# Patient Record
Sex: FEMALE | URBAN - METROPOLITAN AREA
[De-identification: names, ages, dates, MRNs, and addresses within clinical notes are randomized per-mention and may not be internally consistent; named-entity substitution may affect disease eponyms.]

---

## 2017-02-18 ENCOUNTER — HOSPITAL ENCOUNTER (EMERGENCY)
Facility: CLINIC | Age: 22
Discharge: HOME | End: 2017-02-18
Attending: NURSE PRACTITIONER | Admitting: NURSE PRACTITIONER
Payer: COMMERCIAL

## 2017-02-18 ENCOUNTER — APPOINTMENT (OUTPATIENT)
Dept: GENERAL RADIOLOGY | Facility: CLINIC | Age: 22
End: 2017-02-18
Attending: NURSE PRACTITIONER
Payer: COMMERCIAL

## 2017-02-18 VITALS
RESPIRATION RATE: 18 BRPM | WEIGHT: 150 LBS | BODY MASS INDEX: 23.54 KG/M2 | HEART RATE: 73 BPM | HEIGHT: 67 IN | DIASTOLIC BLOOD PRESSURE: 86 MMHG | SYSTOLIC BLOOD PRESSURE: 114 MMHG | TEMPERATURE: 98 F | OXYGEN SATURATION: 99 %

## 2017-02-18 DIAGNOSIS — S63.619A: ICD-10-CM

## 2017-02-18 DIAGNOSIS — S69.92XA FINGER INJURY, LEFT, INITIAL ENCOUNTER: ICD-10-CM

## 2017-02-18 PROCEDURE — 73140 X-RAY EXAM OF FINGER(S): CPT | Mod: LT

## 2017-02-18 PROCEDURE — 29130 APPL FINGER SPLINT STATIC: CPT | Mod: F3

## 2017-02-18 PROCEDURE — 99283 EMERGENCY DEPT VISIT LOW MDM: CPT | Mod: 25

## 2017-02-18 ASSESSMENT — ENCOUNTER SYMPTOMS
RESPIRATORY NEGATIVE: 1
CARDIOVASCULAR NEGATIVE: 1
NEUROLOGICAL NEGATIVE: 1

## 2017-02-18 NOTE — ED AVS SNAPSHOT
[facility] Emergency Department    [address]    [facility] [address]    Phone:  [phone]    Fax:  [phone]                                       Tana Anderson   MRN: [id number]    Department:  [facility] Emergency Department   Date of Visit:  2/18/2017           After Visit Summary Signature Page     I have received my discharge instructions, and my questions have been answered. I have discussed any challenges I see with this plan with the nurse or doctor.    ..........................................................................................................................................  Patient/Patient Representative Signature      ..........................................................................................................................................  Patient Representative Print Name and Relationship to Patient    ..................................................               ................................................  Date                                            Time    ..........................................................................................................................................  Reviewed by Signature/Title    ...................................................              ..............................................  Date                                                            Time

## 2017-02-18 NOTE — ED AVS SNAPSHOT
[facility] Emergency Department    [address] [place] [address]    Phone:  [phone]    Fax:  [phone]                                       Tana Anderson   MRN: [id number]    Department:  [facility] Emergency Department   Date of Visit:  2/18/2017           Patient Information     Date Of Birth          1995        Your diagnoses for this visit were:     Finger injury, left, initial encounter     Sprain of finger, initial encounter        You were seen by Nicole Zapata APRN CNP.      Follow-up Information     Follow up with Verified, No Ref-Primary In 1 week.        Discharge Instructions         Self-Care for Strains and Sprains  Most minor strains and sprains can be treated with self-care. Recovering from a strain or sprain may take 6 to 8 weeks. Your self-care goal is to reduce pain and immobilize the injury to speed healing.     A sprain injures ligaments (tissue that connects bones to bones).        A strain injures muscles or tendons (tissue that connects muscles to bones).   Support the injured area  Wrapping the injured area provides support for short, necessary activities. Be careful not to wrap the area too tightly. This could cut off the blood supply.    Support a wrist, elbow, or shoulder with a sling.    Wrap an ankle or knee with an elastic bandage.    Tape a finger or toe to the one next to it.  Use cold and heat  Cold reduces swelling. Both cold and heat reduce pain. Heat should not be used in the initial treatment of the injury. When using cold or heat, always place a towel between the pack and your skin.    Apply ice or a cold pack 10 to 15 minutes every hour you re awake for the first 2 days.    After the swelling goes down, use cold or heat to control pain. Don t use heat late in the day, since it can cause swelling when you re not active.  Rest and elevate  Rest and elevation help your injury heal faster.    Raise the injured  area above your heart level.    Keep the injured area from moving.    Limit the use of the joint or limb.  Use medicine    Aspirin reduces pain and swelling. (Note: Don t give aspirin to a child 18 or younger unless prescribed by the doctor.)    Aspirin substitutes, such as ibuprofen, can reduce pain. Some substitutes reduce swelling, too. Ask your pharmacist which substitutes you can use.  Call your doctor if:    The injured joint won t move, or bones make a grating sound when they move.    You can t put weight on the injured area, even after 24 hours.    The injured body part is cold, blue, or numb.    The joint or limb appears bent or crooked.    Pain increases or doesn t improve in 4 days.    When pressing along the injured area, you notice a spot that is especially painful.     5850-7324 [employer]. [address] [place], [place] [address]. All rights reserved. This information is not intended as a substitute for professional medical care. Always follow your healthcare professional's instructions.          24 Hour Appointment Hotline       To make an appointment at any [facility], call [phone] ([phone]). If you don't have a family doctor or clinic, we will help you find one. [facility] are conveniently located to serve the needs of you and your family.             Review of your medicines      Our records show that you are taking the medicines listed below. If these are incorrect, please call your family doctor or clinic.        Dose / Directions Last dose taken    escitalopram 10 MG tablet   Commonly known as:  LEXAPRO   Dose:  10 mg   Quantity:  30 tablet        Take 1 tablet (10 mg) by mouth daily   Refills:  1                Procedures and tests performed during your visit     Fingers XR, 2-3 views, left      Orders Needing Specimen Collection     None      Pending Results     No orders found from 2/16/2017 to 2/19/2017.            Pending Culture Results     No  orders found from 2/16/2017 to 2/19/2017.             Test Results from your hospital stay     2/18/2017 10:46 PM - Interface, Radiant Ib      Narrative     FINGER(S) TWO-THREE VIEWS LEFT  2/18/2017 10:39 PM     HISTORY: Fourth finger injury, left.    COMPARISON: None.    FINDINGS: There is no significant degenerative change. There is no  acute fracture or dislocation. There are no worrisome bony lesions.        Impression     IMPRESSION: No acute osseous abnormality demonstrated.     SACHI HERNANDEZ MD                Clinical Quality Measure: Blood Pressure Screening     Your blood pressure was checked while you were in the emergency department today. The last reading we obtained was  BP: 114/86 . Please read the guidelines below about what these numbers mean and what you should do about them.  If your systolic blood pressure (the top number) is less than 120 and your diastolic blood pressure (the bottom number) is less than 80, then your blood pressure is normal. There is nothing more that you need to do about it.  If your systolic blood pressure (the top number) is 120-139 or your diastolic blood pressure (the bottom number) is 80-89, your blood pressure may be higher than it should be. You should have your blood pressure rechecked within a year by a primary care provider.  If your systolic blood pressure (the top number) is 140 or greater or your diastolic blood pressure (the bottom number) is 90 or greater, you may have high blood pressure. High blood pressure is treatable, but if left untreated over time it can put you at risk for heart attack, stroke, or kidney failure. You should have your blood pressure rechecked by a primary care provider within the next 4 weeks.  If your provider in the emergency department today gave you specific instructions to follow-up with your doctor or provider even sooner than that, you should follow that instruction and not wait for up to 4 weeks for your follow-up visit.       "  Thank you for choosing [facility]       Thank you for choosing [facility] for your care. Our goal is always to provide you with excellent care. Hearing back from our patients is one way we can continue to improve our services. Please take a few minutes to complete the written survey that you may receive in the mail after you visit with us. Thank you!        [employer] Information     [employer] lets you send messages to your doctor, view your test results, renew your prescriptions, schedule appointments and more. To sign up, go to [email or web address] . Click on \"Log in\" on the left side of the screen, which will take you to the Welcome page. Then click on \"Sign up Now\" on the right side of the page.     You will be asked to enter the access code listed below, as well as some personal information. Please follow the directions to create your username and password.     Your access code is: [email or web address]  Expires: 2017 10:56 PM     Your access code will  in 90 days. If you need help or a new code, please call your [facility] clinic or [phone].        Care EveryWhere ID     This is your Care EveryWhere ID. This could be used by other organizations to access your [facility] medical records  [id number]        After Visit Summary       This is your record. Keep this with you and show to your community pharmacist(s) and doctor(s) at your next visit.                  "

## 2017-02-19 NOTE — DISCHARGE INSTRUCTIONS
Self-Care for Strains and Sprains  Most minor strains and sprains can be treated with self-care. Recovering from a strain or sprain may take 6 to 8 weeks. Your self-care goal is to reduce pain and immobilize the injury to speed healing.     A sprain injures ligaments (tissue that connects bones to bones).        A strain injures muscles or tendons (tissue that connects muscles to bones).   Support the injured area  Wrapping the injured area provides support for short, necessary activities. Be careful not to wrap the area too tightly. This could cut off the blood supply.    Support a wrist, elbow, or shoulder with a sling.    Wrap an ankle or knee with an elastic bandage.    Tape a finger or toe to the one next to it.  Use cold and heat  Cold reduces swelling. Both cold and heat reduce pain. Heat should not be used in the initial treatment of the injury. When using cold or heat, always place a towel between the pack and your skin.    Apply ice or a cold pack 10 to 15 minutes every hour you re awake for the first 2 days.    After the swelling goes down, use cold or heat to control pain. Don t use heat late in the day, since it can cause swelling when you re not active.  Rest and elevate  Rest and elevation help your injury heal faster.    Raise the injured area above your heart level.    Keep the injured area from moving.    Limit the use of the joint or limb.  Use medicine    Aspirin reduces pain and swelling. (Note: Don t give aspirin to a child 18 or younger unless prescribed by the doctor.)    Aspirin substitutes, such as ibuprofen, can reduce pain. Some substitutes reduce swelling, too. Ask your pharmacist which substitutes you can use.  Call your doctor if:    The injured joint won t move, or bones make a grating sound when they move.    You can t put weight on the injured area, even after 24 hours.    The injured body part is cold, blue, or numb.    The joint or limb appears bent or crooked.    Pain increases  or doesn t improve in 4 days.    When pressing along the injured area, you notice a spot that is especially painful.     1691-1765 [employer]. [address] [place] [address], [place] [address]. All rights reserved. This information is not intended as a substitute for professional medical care. Always follow your healthcare professional's instructions.

## 2017-02-19 NOTE — ED PROVIDER NOTES
"  History     Chief Complaint:    Hand Pain      HPI   Tana Anderson is a 21 year old female who presents with left hand finger injury. Patient states she tried to stop a running dog by grabbing its collar. The dog took off thus sustaining injury to finger. No pain or injury to wrist or elbow.     Allergies:    No Known Allergies     Medications:        escitalopram (LEXAPRO) 10 MG tablet       Problem List:      Patient Active Problem List    Diagnosis Date Noted     Suicidal ideation 09/22/2015     Priority: Medium     Allergic rhinitis 08/31/2009     Problem list name updated by automated process. Provider to review          Past Medical History:      Past Medical History   Diagnosis Date     Depressive disorder        Past Surgical History:      Past Surgical History   Procedure Laterality Date     Hc remove tonsils/adenoids,<12 y/o       T & A <13y.o.     C appendectomy  1/19/2010       Family History:      Family History   Problem Relation Age of Onset     C.A.D. No family hx of      DIABETES No family hx of      Hypertension No family hx of      Breast Cancer No family hx of      Cancer - colorectal No family hx of        Social History:    Marital Status:  Single [1]  Social History   Substance Use Topics     Smoking status: Never Smoker     Smokeless tobacco: Not on file     Alcohol use No        Review of Systems   HENT: Negative.    Respiratory: Negative.    Cardiovascular: Negative.    Neurological: Negative.    Left hand finger injury  All other systems are negative.        Physical Exam   First Vitals:  BP: 114/86  Pulse: 73  Temp: 98  F (36.7  C)  Resp: 18  Height: 170.2 cm (5' 7\")  Weight: 68 kg (150 lb)  SpO2: 99 %      Physical Exam  Eyes: Pupils equally round  HENT: Head is normal in appearance. Oropharynx is normal with moist mucus membranes.  Cardiovascular: Normal color of mucus membranes  Respiratory: Normal respiratory effort  Musculoskeletal: Left hand finger mild soft tissue swelling, " normal flexion and extension, no open skin, radial and ulna pulses intact, CMS intact to fingers.    Skin: Normal, without rash.  Lymphatic: No edema  Neurologic: Cranial nerves grossly intact, normal cognition, no apparent deficits.  Psychiatric: Normal affect.      Emergency Department Course     Emergency Department Course:    ED Course       Impression & Plan      Medical Decision Making:  Tana Nguyen female presents with left hand finger injury. XR is negative for fractures. Based on exam and presentation I do not suspect ligamentous injury patient has full ROM to finger. Normal radial and ulna pulses. This appears to be a finger sprain. Patient provided with a removal finger splint to use prn. Instructed on RICE, will use OTC remedies prn. Return ED if symptoms persist or develops new or worsening symptoms. Recommend follow up with PCP in 2-3 days.     Diagnosis:    ICD-10-CM    1. Finger injury, left, initial encounter S69.92XA    2. Sprain of finger, initial encounter S63.619A        Disposition:    Discharge Medications:  New Prescriptions    No medications on file         Nicole Zapata  2/18/2017   [facility] EMERGENCY DEPARTMENT       Nicole Zapata, APRN CNP  02/18/17 2259

## 2017-02-19 NOTE — ED NOTES
Pt states she tried to stop a running dog by grabbing its collar. Pain in left 4th digit. Started about an hour ago.    Airway, breathing and circulation intact without need for intervention. Alert and interacting appropriately for age and situation.    HOME MEDICATIONS:  List in EPIC is correct.

## 2023-03-22 ENCOUNTER — APPOINTMENT (OUTPATIENT)
Dept: URBAN - METROPOLITAN AREA CLINIC 253 | Age: 28
Setting detail: DERMATOLOGY
End: 2023-03-29

## 2023-03-22 VITALS — WEIGHT: 135 LBS | HEIGHT: 67 IN | RESPIRATION RATE: 14 BRPM

## 2023-03-22 DIAGNOSIS — L60.3 NAIL DYSTROPHY: ICD-10-CM

## 2023-03-22 DIAGNOSIS — L60.1 ONYCHOLYSIS: ICD-10-CM

## 2023-03-22 PROCEDURE — OTHER ADDITIONAL NOTES: OTHER

## 2023-03-22 PROCEDURE — OTHER MIPS QUALITY: OTHER

## 2023-03-22 PROCEDURE — 99202 OFFICE O/P NEW SF 15 MIN: CPT

## 2023-03-22 PROCEDURE — OTHER COUNSELING: OTHER

## 2023-03-22 ASSESSMENT — LOCATION ZONE DERM
LOCATION ZONE: TOENAIL
LOCATION ZONE: FINGERNAIL

## 2023-03-22 ASSESSMENT — LOCATION DETAILED DESCRIPTION DERM
LOCATION DETAILED: LEFT GREAT TOENAIL
LOCATION DETAILED: LEFT INDEX FINGERNAIL
LOCATION DETAILED: RIGHT GREAT TOENAIL

## 2023-03-22 ASSESSMENT — LOCATION SIMPLE DESCRIPTION DERM
LOCATION SIMPLE: LEFT GREAT TOE
LOCATION SIMPLE: LEFT INDEX FINGERNAIL
LOCATION SIMPLE: RIGHT GREAT TOE

## 2023-03-22 NOTE — HPI: NAIL DISORDER
Is This A New Presentation, Or A Follow-Up?: Nail Disorder
How Severe Is It?: mild
Additional History: The patient has had a split in her nail for years, this splits apart and causes her pain when the nail grows out. Keeping her nail very short helps manage this. A previous physician told her that this was likely due to trauma to the nail bed in childhood though she does not recall a specific event that may have caused this.

## 2023-03-22 NOTE — PROCEDURE: ADDITIONAL NOTES
Render Risk Assessment In Note?: no
Detail Level: Zone
Additional Notes: There is no evidence of fungus or infection, this appears to be a result of previous damage to the nail matrix. We discussed vitamins and supplements, the patient regularly uses a women?s daily multivitamin. Observation photo taken today. I recommended she try using a nail strengthener regularly.

## 2025-03-15 ENCOUNTER — HOSPITAL ENCOUNTER (EMERGENCY)
Facility: CLINIC | Age: 30
Discharge: HOME | End: 2025-03-15
Attending: EMERGENCY MEDICINE | Admitting: EMERGENCY MEDICINE
Payer: COMMERCIAL

## 2025-03-15 ENCOUNTER — APPOINTMENT (OUTPATIENT)
Dept: GENERAL RADIOLOGY | Facility: CLINIC | Age: 30
End: 2025-03-15
Attending: EMERGENCY MEDICINE
Payer: COMMERCIAL

## 2025-03-15 VITALS
SYSTOLIC BLOOD PRESSURE: 116 MMHG | TEMPERATURE: 98.4 F | BODY MASS INDEX: 20.42 KG/M2 | DIASTOLIC BLOOD PRESSURE: 81 MMHG | OXYGEN SATURATION: 99 % | RESPIRATION RATE: 20 BRPM | HEART RATE: 98 BPM | HEIGHT: 67 IN | WEIGHT: 130.07 LBS

## 2025-03-15 DIAGNOSIS — U07.1 COVID-19 VIRUS INFECTION: ICD-10-CM

## 2025-03-15 LAB
FLUAV RNA SPEC QL NAA+PROBE: NEGATIVE
FLUBV RNA RESP QL NAA+PROBE: NEGATIVE
RSV RNA SPEC NAA+PROBE: NEGATIVE
S PYO DNA THROAT QL NAA+PROBE: NOT DETECTED
SARS-COV-2 RNA RESP QL NAA+PROBE: POSITIVE

## 2025-03-15 PROCEDURE — 71046 X-RAY EXAM CHEST 2 VIEWS: CPT

## 2025-03-15 PROCEDURE — 87637 SARSCOV2&INF A&B&RSV AMP PRB: CPT | Performed by: EMERGENCY MEDICINE

## 2025-03-15 PROCEDURE — 99284 EMERGENCY DEPT VISIT MOD MDM: CPT | Mod: 25

## 2025-03-15 PROCEDURE — 250N000013 HC RX MED GY IP 250 OP 250 PS 637: Performed by: EMERGENCY MEDICINE

## 2025-03-15 PROCEDURE — 87651 STREP A DNA AMP PROBE: CPT | Performed by: EMERGENCY MEDICINE

## 2025-03-15 RX ORDER — ACETAMINOPHEN 500 MG
500 TABLET ORAL ONCE
Status: COMPLETED | OUTPATIENT
Start: 2025-03-15 | End: 2025-03-15

## 2025-03-15 RX ADMIN — ACETAMINOPHEN 500 MG: 500 TABLET ORAL at 11:53

## 2025-03-15 ASSESSMENT — ACTIVITIES OF DAILY LIVING (ADL)
ADLS_ACUITY_SCORE: 41
ADLS_ACUITY_SCORE: 41

## 2025-03-15 ASSESSMENT — COLUMBIA-SUICIDE SEVERITY RATING SCALE - C-SSRS
1. IN THE PAST MONTH, HAVE YOU WISHED YOU WERE DEAD OR WISHED YOU COULD GO TO SLEEP AND NOT WAKE UP?: NO
2. HAVE YOU ACTUALLY HAD ANY THOUGHTS OF KILLING YOURSELF IN THE PAST MONTH?: NO
6. HAVE YOU EVER DONE ANYTHING, STARTED TO DO ANYTHING, OR PREPARED TO DO ANYTHING TO END YOUR LIFE?: NO

## 2025-03-15 NOTE — Clinical Note
Tana Anderson was seen and treated in our emergency department on 3/15/2025.  She may return to work on 03/20/2025.  Excuse until fever free for at least 24 hours off tylenol / ibuprofen and improving symptoms     If you have any questions or concerns, please don't hesitate to call.      Horace Estrada MD

## 2025-03-15 NOTE — ED PROVIDER NOTES
"  Emergency Department Note      History of Present Illness     Chief Complaint   Fever      HPI   Tana Anderson is a 29 year old female presenting to the emergency department for evaluation of a fever.  History obtained from patient and supplemented by significant other present at bedside.  Beginning on Monday of last week, the patient developed a sore throat.  Beginning on Wednesday, she noted the onset of fevers.  Between Wednesday and Friday, fevers have ranged between 102 and 103.  Today, she measured a fever between 101 101.  She has been alternating Tylenol and ibuprofen.  Her sore throat persists.  She is also developed a cough that has been productive of sputum.  She denies any known sick contacts.  She denies any vomiting or diarrhea.  She does not note significant headache.  She denies any foreign travel.  She denies any possibility of pregnancy.  She has not received her influenza vaccine this year.    Independent Historian   Significant other present at bedside    Review of External Notes   PCP physical reviewed from 8/27/2024    Past Medical History     Medical History and Problem List   Past Medical History:   Diagnosis Date    Depressive disorder        Medications   escitalopram (LEXAPRO) 10 MG tablet        Surgical History   Past Surgical History:   Procedure Laterality Date    HC REMOVE TONSILS/ADENOIDS,<12 Y/O      T & A <13y.o.    ZZC APPENDECTOMY  1/19/2010       Physical Exam     Patient Vitals for the past 24 hrs:   BP Temp Temp src Pulse Resp SpO2 Height Weight   03/15/25 1112 116/81 98.4  F (36.9  C) Oral 98 20 99 % 1.702 m (5' 7\") 59 kg (130 lb 1.1 oz)     Physical Exam  General:   Well-nourished   Speaking in full sentences   Well-appearing  Eyes:   Conjunctiva without injection or scleral icterus   PERRL   EOM full w/out entrapment or proptosis  ENT:   Moist mucous membranes   Posterior oropharynx with minimal erythema   No tonsillar hypertrophy, exudate, asymmetry, nor uvular " HR=75 bpm, YTZN=663/82 mmhg, SpO2=98.0 %, Resp=21 B/min, EtCO2=34 mmHg, Apnea=0 Seconds, Pain=0, [place]=3 deviation   No oral lesions   Bilateral TM translucent and gray without air/fluid level or overlying erythema, bony landmarks visualized.   Nares patent   Pinnae normal   No midface swelling, erythema, or asymmetry  Neck:   Full ROM   No stiffness appreciated   No lymphadenopathy  Resp:   Lungs CTAB   No crackles, wheezing or audible rubs   Good air movement  CV:    Normal rate, regular rhythm   S1 and S2 present   No murmur, gallop or rub  Skin:   Warm, dry, well perfused   No rashes or open wounds on exposed skin  MSK:   Moves all extremities   No focal deformities or swelling  Neuro:   Alert   Answers questions appropriately   Moves all extremities equally   Gait stable  Psych:   Normal affect, normal mood        Diagnostics     Lab Results   Labs Ordered and Resulted from Time of ED Arrival to Time of ED Departure   INFLUENZA A/B, RSV AND SARS-COV2 PCR - Abnormal       Result Value    Influenza A PCR Negative      Influenza B PCR Negative      RSV PCR Negative      SARS CoV2 PCR Positive (*)    GROUP A STREPTOCOCCUS PCR THROAT SWAB - Normal    Group A strep by PCR Not Detected         Imaging   Chest XR,  PA & LAT   Final Result   IMPRESSION: Negative chest.        Independent Interpretation   Chest x-ray negative for acute infiltrate    ED Course      Medications Administered   Medications   acetaminophen (TYLENOL) tablet 500 mg (500 mg Oral $Given 3/15/25 1153)       Procedures   Procedures     Discussion of Management   None    ED Course   ED Course as of 03/15/25 1626   Sat Mar 15, 2025   1304 Patient re-evaluated.  Discussed risks/benefits of Paxlovid.  Patient preferring to forego and continue with supportive measures.       Additional Documentation  None    Medical Decision Making / Diagnosis     CMS Diagnoses: None    MIPS       None    MDM   Tana Anderson is a 29 year old female who presents to the ER for fever.  Vital signs on presentation reveal no acute abnormalities.  Differential diagnosis is  broad, including but not limited to influenza, influenza-like-illness, COVID-19, serious bacterial infection (bacteremia, meningitis, UTI/pyelopnephritis, pneumonia), strep pharyngitis deep space neck infection, among others.    At this point, patient's signs and symptoms are consistent with COVID-19.  Testing as above, positive for COVID.  Influenza, RSV and strep testing has returned negative.  Discussed Paxlovid therapy, including risks and benefits, and after thoughtful consideration, patient is electing to forego this medication.  She is otherwise healthy and does not otherwise have significant risk factors that would portend a more complicated course, for which I feel this to be a reasonable decision.  Other symptoms are noted as above.  No evidence of acute otitis media, otitis externa, mastoiditis, nor findings that would suggest deeper space neck infection such as peritonsillar abscess, retropharyngeal abscess, nor epiglottitis.  Pneumonia considered, though pulmonary exam is clear, work of breathing is unremarkable, and patient is without hypoxia.  Chest x-ray negative.  At present, bacterial meningitis seems very unlikely.  Patient is non-toxic in appearance, interacting with this writer appropriately, with a supple neck exam, and normal vital signs. Close followup of primary care physician is indicated in the coming 2-3 days for re-check.  Return to the ER for high fevers > 103 for more than 48 hours more, increasing productive cough, shortness of breath, or confusion.       Disposition   The patient was discharged.     Diagnosis     ICD-10-CM    1. COVID-19 virus infection  U07.1           MD Sean Espinosa Brian Donald, MD  03/15/25 1626

## 2025-03-15 NOTE — DISCHARGE INSTRUCTIONS
Discharge Instructions  COVID-19    COVID-19 is a viral illness that spreads from person-to-person primarily by droplets when an infected person coughs or sneezes and the droplets are then breathed in by another person.    Symptoms of COVID-19  Many people have no symptoms or mild symptoms.  Symptoms usually appear within a few days after contact with a person with COVID-19.  A mild COVID-19 illness is like a cold and can have fever, cough, sneezing, sore throat, tiredness, headache, and muscle pain. Some patients also have stomach symptoms like nausea, vomiting, or diarrhea.  A moderate COVID-19 illness might include shortness of breath or pneumonia on a chest x-ray.  A severe COVID-19 illness causes significant breathing problems such as low oxygen levels or more serious pneumonia.  Some patients experience loss of taste or smell which is somewhat unique to COVID-19.    What should I do if I test positive?  If you test positive for COVID and have no symptoms, you should take precautions, as described below, for five days.  If you test positive for COVID and have symptoms, you should stay home and away from others. You can go back to normal activities when you are feeling better and have been without a fever (without using medications to treat the fever) for 24 hours. When you return to normal activities you should take precautions, as described below, for five days.  Precautions to prevent the spread of COVID-19  Clean Air. Viruses spread from person to person in the air. Bring fresh air into your home by opening doors or windows or using exhaust fans if possible. Use an air filter. Be outdoors when possible.  Practice good hygiene. Cover your mouth and nose with a tissue when you cough or sneeze. Wash your hands often with soap and water for at least 20 seconds or use an       alcohol-based hand  containing at least 60% alcohol. Avoid touching your face. Clean surfaces such as countertops, handrails,  and doorknobs.  Wear a facemask. Masks both prevent an infected person from spreading the virus and prevent a well person from getting the virus. Cloth masks are good, surgical/disposable masks are better, and respirators (N95) are the best.  Practice physical distancing. Avoid being close to someone with cough or other symptoms. Avoid crowded spaces.   What should I do for myself while I am sick?  Treat your symptoms. You can take Acetaminophen (Tylenol) to treat body aches and fever as needed for comfort. Ibuprofen (Advil or Motrin) can be used as well if you still have symptoms after taking Tylenol. Drink fluids. Rest.  Watch for worsening symptoms such as shortness of breath/difficulty breathing or very severe weakness.  Exercise/Sports in rare cases, COVID could affect your heart in a way that makes exercise or participation in sports dangerous.  If you have a mild COVID illness (fever, cough, sore throat, and similar symptoms but no difficulty breathing or abnormalities of the lung): After your COVID symptoms have resolved, you can return to exercise. If you develop difficulty breathing or chest discomfort with exercise, palpitations (a sensation of your heart racing or skipping), or lightheadedness/passing out, you should contact your doctor/clinic.  If you have more than a mild illness (meaning that you have problems with your breathing or lungs) or if you participate in competitive or strenuous activity or have a history of heart disease: Please see your primary doctor/provider prior to return to activity/competition.    COVID treatments such as antiviral medications are available. They are recommended for those patients who have a risk for developing more severe COVID illness. Age is the biggest risk factor. Risk is increased for adults greater than 50 years old and particularly for adults greater than 65 years. Importantly, the treatments must be started early in the illness (within five days). These  treatments may have been considered today during your visit. If you have other questions, contact your primary doctor/clinic.    You can learn more about COVID treatments from the [facility]:  [email or web address]            What should I do if I am exposed to COVID?  If you are exposed to COVID, you should monitor for symptoms and test if you develop symptoms. Practicing the precautions discussed above are a good idea, particularly if you plan to be around any person who is at higher risk of COVID complications such as older patients (>65) or people with significant medical problems.            Return to the Emergency Department if:  If you are developing worsening breathing, weakness, or feel worse you should seek medical attention.  If you are uncertain, contact your health care provider/clinic. If you need emergency medical attention, call 911.

## 2025-03-15 NOTE — ED TRIAGE NOTES
Patient presents to ER for fever x 5 days that has not gone away.  Also reports chills, body aches, and cough. Has not been seen for issue. Last took antipyretics yesterday.     Triage Assessment (Adult)       Row Name 03/15/25 1110          Triage Assessment    Airway WDL WDL        Respiratory WDL    Respiratory WDL cough        Cardiac WDL    Cardiac WDL WDL